# Patient Record
Sex: MALE | Race: WHITE | NOT HISPANIC OR LATINO | URBAN - NONMETROPOLITAN AREA
[De-identification: names, ages, dates, MRNs, and addresses within clinical notes are randomized per-mention and may not be internally consistent; named-entity substitution may affect disease eponyms.]

---

## 2022-09-20 ENCOUNTER — OFFICE VISIT (OUTPATIENT)
Dept: URBAN - NONMETROPOLITAN AREA CLINIC 2 | Facility: CLINIC | Age: 67
End: 2022-09-20
Payer: MEDICARE

## 2022-09-20 ENCOUNTER — LAB OUTSIDE AN ENCOUNTER (OUTPATIENT)
Dept: URBAN - NONMETROPOLITAN AREA CLINIC 2 | Facility: CLINIC | Age: 67
End: 2022-09-20

## 2022-09-20 VITALS
WEIGHT: 175 LBS | DIASTOLIC BLOOD PRESSURE: 72 MMHG | BODY MASS INDEX: 24.5 KG/M2 | HEIGHT: 71 IN | TEMPERATURE: 97 F | SYSTOLIC BLOOD PRESSURE: 134 MMHG | HEART RATE: 74 BPM

## 2022-09-20 DIAGNOSIS — Z86.010 PERSONAL HISTORY OF COLONIC POLYPS: ICD-10-CM

## 2022-09-20 DIAGNOSIS — K59.00 CONSTIPATION, UNSPECIFIED CONSTIPATION TYPE: ICD-10-CM

## 2022-09-20 PROBLEM — 14760008: Status: ACTIVE | Noted: 2022-09-20

## 2022-09-20 PROCEDURE — 99203 OFFICE O/P NEW LOW 30 MIN: CPT | Performed by: INTERNAL MEDICINE

## 2022-09-20 RX ORDER — NEOMYCIN SULFATE, POLYMYXIN B SULFATE AND HYDROCORTISONE 10; 3.5; 1 MG/ML; MG/ML; [USP'U]/ML
SUSPENSION/ DROPS AURICULAR (OTIC)
Qty: 10 MILLILITER | Status: ACTIVE | COMMUNITY

## 2022-09-20 RX ORDER — ATORVASTATIN CALCIUM 40 MG/1
TABLET, FILM COATED ORAL
Qty: 90 TABLET | Status: ACTIVE | COMMUNITY

## 2022-09-20 RX ORDER — VIT A/VIT C/VIT E/ZINC/COPPER 2148-113
AS DIRECTED TABLET ORAL
Status: ACTIVE | COMMUNITY
Start: 2022-09-20

## 2022-09-20 RX ORDER — POLYETHYLENE GLYCOL 3350, SODIUM SULFATE, SODIUM CHLORIDE, POTASSIUM CHLORIDE, ASCORBIC ACID, SODIUM ASCORBATE 140-9-5.2G
AS DIRECTED KIT ORAL
Qty: 280 GRAM | Refills: 0 | OUTPATIENT
Start: 2022-09-20 | End: 2022-09-21

## 2022-09-20 RX ORDER — MECOBALAMIN 5000 MCG
AS DIRECTED LOZENGE ORAL
Status: ACTIVE | COMMUNITY
Start: 2022-09-20

## 2022-09-20 RX ORDER — AMLODIPINE BESYLATE 5 MG/1
TABLET ORAL
Qty: 90 TABLET | Status: ACTIVE | COMMUNITY

## 2022-09-20 RX ORDER — OMEPRAZOLE 20 MG/1
CAPSULE, DELAYED RELEASE ORAL
Qty: 90 CAPSULE | Status: ACTIVE | COMMUNITY

## 2022-09-20 NOTE — HPI-TODAY'S VISIT:
9/20/2022 The patient is a 66-year-old male who presents today for a screening colonoscopy.  He had a colonoscopy done 5 years ago in [place].  At that time, he was found to have colon polyps.  He has had multiple polyps in the past.  He states that he has had very large polyps removed.  Currently, he does have occasional problems with constipation.  He has started taking MiraLAX, and this is helped significantly with his bowels.  He does take NSAIDs fairly regularly, and he feels that this causes his constipation to be worse.  Today, we have discussed the risks and benefits of proceeding with colonoscopy and and he does wish to proceed.

## 2022-10-17 ENCOUNTER — OFFICE VISIT (OUTPATIENT)
Dept: URBAN - NONMETROPOLITAN AREA SURGERY CENTER 1 | Facility: SURGERY CENTER | Age: 67
End: 2022-10-17
Payer: MEDICARE

## 2022-10-17 DIAGNOSIS — Z86.010 ADENOMAS PERSONAL HISTORY OF COLONIC POLYPS: ICD-10-CM

## 2022-10-17 PROCEDURE — G8907 PT DOC NO EVENTS ON DISCHARG: HCPCS | Performed by: INTERNAL MEDICINE

## 2022-10-17 PROCEDURE — G0105 COLORECTAL SCRN; HI RISK IND: HCPCS | Performed by: INTERNAL MEDICINE

## 2023-01-23 ENCOUNTER — OFFICE VISIT (OUTPATIENT)
Dept: URBAN - NONMETROPOLITAN AREA SURGERY CENTER 1 | Facility: SURGERY CENTER | Age: 68
End: 2023-01-23

## 2024-02-14 ENCOUNTER — OV EP (OUTPATIENT)
Dept: URBAN - NONMETROPOLITAN AREA CLINIC 2 | Facility: CLINIC | Age: 69
End: 2024-02-14
Payer: MEDICARE

## 2024-02-14 VITALS
WEIGHT: 182 LBS | DIASTOLIC BLOOD PRESSURE: 76 MMHG | BODY MASS INDEX: 25.48 KG/M2 | HEART RATE: 69 BPM | SYSTOLIC BLOOD PRESSURE: 146 MMHG | HEIGHT: 71 IN

## 2024-02-14 DIAGNOSIS — D64.89 OTHER SPECIFIED ANEMIAS: ICD-10-CM

## 2024-02-14 DIAGNOSIS — K59.09 CHRONIC CONSTIPATION: ICD-10-CM

## 2024-02-14 DIAGNOSIS — Z86.010 PERSONAL HISTORY OF COLONIC POLYPS: ICD-10-CM

## 2024-02-14 PROBLEM — 428283002: Status: ACTIVE | Noted: 2022-09-20

## 2024-02-14 PROBLEM — 271737000: Status: ACTIVE | Noted: 2024-02-14

## 2024-02-14 PROCEDURE — 99213 OFFICE O/P EST LOW 20 MIN: CPT | Performed by: NURSE PRACTITIONER

## 2024-02-14 PROCEDURE — 99243 OFF/OP CNSLTJ NEW/EST LOW 30: CPT | Performed by: NURSE PRACTITIONER

## 2024-02-14 RX ORDER — NEOMYCIN SULFATE, POLYMYXIN B SULFATE AND HYDROCORTISONE 10; 3.5; 1 MG/ML; MG/ML; [USP'U]/ML
SUSPENSION/ DROPS AURICULAR (OTIC)
Qty: 10 MILLILITER | Status: ACTIVE | COMMUNITY

## 2024-02-14 RX ORDER — OMEPRAZOLE 20 MG/1
CAPSULE, DELAYED RELEASE ORAL
Qty: 90 CAPSULE | Status: ON HOLD | COMMUNITY

## 2024-02-14 RX ORDER — MECOBALAMIN 5000 MCG
AS DIRECTED LOZENGE ORAL
Status: ACTIVE | COMMUNITY
Start: 2022-09-20

## 2024-02-14 RX ORDER — ASCORBIC ACID 1000 MG
1 TABLET TABLET ORAL ONCE A DAY
Status: ACTIVE | COMMUNITY

## 2024-02-14 RX ORDER — FOLIC ACID 0.4 MG
1 TABLET TABLET ORAL ONCE A DAY
Status: ACTIVE | COMMUNITY

## 2024-02-14 RX ORDER — ATORVASTATIN CALCIUM 40 MG/1
TABLET, FILM COATED ORAL
Qty: 90 TABLET | Status: ACTIVE | COMMUNITY

## 2024-02-14 RX ORDER — AMLODIPINE BESYLATE 5 MG/1
TABLET ORAL
Qty: 90 TABLET | Status: ACTIVE | COMMUNITY

## 2024-02-14 RX ORDER — FAMOTIDINE 20 MG/1
1 TABLET AT BEDTIME AS NEEDED TABLET, FILM COATED ORAL ONCE A DAY
Status: ACTIVE | COMMUNITY

## 2024-02-14 RX ORDER — CHOLECALCIFEROL (VITAMIN D3) 100000/G
AS DIRECTED POWDER (GRAM) MISCELLANEOUS
Status: ACTIVE | COMMUNITY

## 2024-02-14 RX ORDER — VIT A/VIT C/VIT E/ZINC/COPPER 2148-113
AS DIRECTED TABLET ORAL
Status: ACTIVE | COMMUNITY
Start: 2022-09-20

## 2024-02-14 NOTE — PHYSICAL EXAM CONSTITUTIONAL:
well developed, well nourished , in no acute distress , ambulating without difficulty , normal communication ability Katie

## 2024-02-14 NOTE — HPI-TODAY'S VISIT:
Mr. Usman Braden is a 68-year-old male who was referred to our office for anemia by Angelina Sorenson.  A copy of this note and recommendations will be sent to the referring provider's office.  Usman reports that he was being seen for routine health maintenance in July of last year and his hemoglobin was noted to be 13.7.  Repeat hemoglobin in November was 13.0.  Usman denies seeing any overt blood in his stool.  He also denies any melena and hematemesis.  He denies feeling fatigued, lightheaded, dizzy or weak.  Last colonoscopy was in October 2022.  He has never had an upper endoscopy.  Does take NSAIDs for occasional discomfort.  LG.

## 2024-02-14 NOTE — HPI-OTHER HISTORIES
9/20/2022 The patient is a 66-year-old male who presents today for a screening colonoscopy. He had a colonoscopy done 5 years ago in [place]. At that time, he was found to have colon polyps. He has had multiple polyps in the past. He states that he has had very large polyps removed. Currently, he does have occasional problems with constipation. He has started taking MiraLAX, and this is helped significantly with his bowels. He does take NSAIDs fairly regularly, and he feels that this causes his constipation to be worse. Today, we have discussed the risks and benefits of proceeding with colonoscopy and and he does wish to proceed.  COLON 10/2022 - normal, repeat in 5 years

## 2024-02-15 LAB
ABSOLUTE BASOPHILS: 48
ABSOLUTE EOSINOPHILS: 313
ABSOLUTE LYMPHOCYTES: 1530
ABSOLUTE MONOCYTES: 714
ABSOLUTE NEUTROPHILS: 4196
BASOPHILS: 0.7
EOSINOPHILS: 4.6
FERRITIN, SERUM: 144
HEMATOCRIT: 38.5
HEMOGLOBIN: 13.1
IRON BIND.CAP.(TIBC): 276
IRON SATURATION: 34
IRON: 93
LYMPHOCYTES: 22.5
MCH: 33.5
MCHC: 34
MCV: 98.5
MONOCYTES: 10.5
MPV: 9.4
NEUTROPHILS: 61.7
PLATELET COUNT: 249
RDW: 14.3
RED BLOOD CELL COUNT: 3.91
WHITE BLOOD CELL COUNT: 6.8

## 2024-05-24 ENCOUNTER — DASHBOARD ENCOUNTERS (OUTPATIENT)
Age: 69
End: 2024-05-24

## 2024-05-24 ENCOUNTER — OFFICE VISIT (OUTPATIENT)
Dept: URBAN - NONMETROPOLITAN AREA CLINIC 2 | Facility: CLINIC | Age: 69
End: 2024-05-24
Payer: MEDICARE

## 2024-05-24 VITALS
SYSTOLIC BLOOD PRESSURE: 146 MMHG | TEMPERATURE: 98 F | DIASTOLIC BLOOD PRESSURE: 65 MMHG | BODY MASS INDEX: 25.06 KG/M2 | HEART RATE: 69 BPM | HEIGHT: 71 IN | WEIGHT: 179 LBS

## 2024-05-24 DIAGNOSIS — K59.09 CHRONIC CONSTIPATION: ICD-10-CM

## 2024-05-24 DIAGNOSIS — Z86.010 PERSONAL HISTORY OF COLONIC POLYPS: ICD-10-CM

## 2024-05-24 DIAGNOSIS — D64.9 MILD ANEMIA: ICD-10-CM

## 2024-05-24 PROBLEM — 156370009: Status: ACTIVE | Noted: 2024-05-24

## 2024-05-24 PROCEDURE — 99212 OFFICE O/P EST SF 10 MIN: CPT | Performed by: NURSE PRACTITIONER

## 2024-05-24 RX ORDER — FOLIC ACID 0.4 MG
1 TABLET TABLET ORAL ONCE A DAY
Status: ACTIVE | COMMUNITY

## 2024-05-24 RX ORDER — AMLODIPINE BESYLATE 5 MG/1
TABLET ORAL
Qty: 90 TABLET | Status: ACTIVE | COMMUNITY

## 2024-05-24 RX ORDER — ATORVASTATIN CALCIUM 40 MG/1
TABLET, FILM COATED ORAL
Qty: 90 TABLET | Status: ACTIVE | COMMUNITY

## 2024-05-24 RX ORDER — ASCORBIC ACID 1000 MG
1 TABLET TABLET ORAL ONCE A DAY
Status: ACTIVE | COMMUNITY

## 2024-05-24 RX ORDER — FAMOTIDINE 20 MG/1
1 TABLET AT BEDTIME AS NEEDED TABLET, FILM COATED ORAL ONCE A DAY
Status: ACTIVE | COMMUNITY

## 2024-05-24 RX ORDER — VIT A/VIT C/VIT E/ZINC/COPPER 2148-113
AS DIRECTED TABLET ORAL
Status: ACTIVE | COMMUNITY
Start: 2022-09-20

## 2024-05-24 RX ORDER — MECOBALAMIN 5000 MCG
AS DIRECTED LOZENGE ORAL
Status: ACTIVE | COMMUNITY
Start: 2022-09-20

## 2024-05-24 RX ORDER — CHOLECALCIFEROL (VITAMIN D3) 100000/G
AS DIRECTED POWDER (GRAM) MISCELLANEOUS
Status: ACTIVE | COMMUNITY

## 2024-05-24 RX ORDER — NEOMYCIN SULFATE, POLYMYXIN B SULFATE AND HYDROCORTISONE 10; 3.5; 1 MG/ML; MG/ML; [USP'U]/ML
SUSPENSION/ DROPS AURICULAR (OTIC)
Qty: 10 MILLILITER | Status: ACTIVE | COMMUNITY

## 2025-07-21 ENCOUNTER — OFFICE VISIT (OUTPATIENT)
Dept: URBAN - NONMETROPOLITAN AREA CLINIC 13 | Facility: CLINIC | Age: 70
End: 2025-07-21
Payer: MEDICARE

## 2025-07-21 DIAGNOSIS — D64.9 MILD ANEMIA: ICD-10-CM

## 2025-07-21 DIAGNOSIS — R63.4 WEIGHT LOSS: ICD-10-CM

## 2025-07-21 DIAGNOSIS — K59.00 CONSTIPATION, UNSPECIFIED CONSTIPATION TYPE: ICD-10-CM

## 2025-07-21 DIAGNOSIS — L40.50 PSORIATIC ARTHRITIS: ICD-10-CM

## 2025-07-21 PROCEDURE — 99212 OFFICE O/P EST SF 10 MIN: CPT | Performed by: NURSE PRACTITIONER

## 2025-07-21 RX ORDER — FAMOTIDINE 20 MG/1
TABLET, FILM COATED ORAL
Qty: 180 TABLET | Status: ACTIVE | COMMUNITY

## 2025-07-21 RX ORDER — METHOTREXATE 2.5 MG/1
TAKE 6 TABLETS BY MOUTH ONCE A WEEK TABLET ORAL
Qty: 78 EACH | Refills: 0 | Status: ACTIVE | COMMUNITY

## 2025-07-21 RX ORDER — ATORVASTATIN CALCIUM 40 MG/1
TABLET, FILM COATED ORAL
Qty: 90 TABLET | Status: ACTIVE | COMMUNITY

## 2025-07-21 RX ORDER — AMLODIPINE BESYLATE 5 MG/1
TABLET ORAL
Qty: 90 TABLET | Status: ACTIVE | COMMUNITY

## 2025-07-21 NOTE — HPI-OTHER HISTORIES
9/20/2022 The patient is a 66-year-old male who presents today for a screening colonoscopy. He had a colonoscopy done 5 years ago in [place]. At that time, he was found to have colon polyps. He has had multiple polyps in the past. He states that he has had very large polyps removed. Currently, he does have occasional problems with constipation. He has started taking MiraLAX, and this is helped significantly with his bowels. He does take NSAIDs fairly regularly, and he feels that this causes his constipation to be worse. Today, we have discussed the risks and benefits of proceeding with colonoscopy and and he does wish to proceed.  COLON 10/2022 - normal, repeat in 5 years  2/14/2024: Mr. Usman Braden is a 68-year-old male who was referred to our office for anemia by Angelina Sorenson. A copy of this note and recommendations will be sent to the referring provider's office. Usman reports that he was being seen for routine health maintenance in July of last year and his hemoglobin was noted to be 13.7. Repeat hemoglobin in November was 13.0. Usman denies seeing any overt blood in his stool. He also denies any melena and hematemesis. He denies feeling fatigued, lightheaded, dizzy or weak. Last colonoscopy was in October 2022. He has never had an upper endoscopy. Does take NSAIDs for occasional discomfort. LG.  5/2024:  Usman presents for f/u of mild anemia. He reports doing well today. Denies any hematochezia, melena, or hematemesis. He had labs drawn with PCP on Monday and hgb was 13.8. He is on methotrexate, aleve, and taltz for psoriatic arthritis and we discussed the possibility of gastritis developing with chronic use. Patient currently takes famotidine nightly and denies any indigestion, n/v, abd pain, melena, hematemesis or dysphagia. Will monitor. No other GI questions or concerns today. LG.

## 2025-07-21 NOTE — HPI-TODAY'S VISIT:
Usman presents for follow-up of mild anemia.  Labs have stayed stable.  He was referred to Dr. Garcia who also felt that monitoring was appropriate and did not feel EGD or colonoscopy were indicated.  He denies any overt bleeding.  He has lost some weight over the last year and he thinks this is due to dental work.  He reports losing weight down to 160 pounds but he is now back up to 172.  He continues nutritional supplements.  LG.